# Patient Record
Sex: FEMALE | Race: WHITE | URBAN - METROPOLITAN AREA
[De-identification: names, ages, dates, MRNs, and addresses within clinical notes are randomized per-mention and may not be internally consistent; named-entity substitution may affect disease eponyms.]

---

## 2017-06-09 ENCOUNTER — HISTORICAL (OUTPATIENT)
Dept: ADMINISTRATIVE | Facility: HOSPITAL | Age: 67
End: 2017-06-09

## 2017-07-14 ENCOUNTER — HISTORICAL (OUTPATIENT)
Dept: ADMINISTRATIVE | Facility: HOSPITAL | Age: 67
End: 2017-07-14

## 2018-08-07 ENCOUNTER — HISTORICAL (OUTPATIENT)
Dept: ADMINISTRATIVE | Facility: HOSPITAL | Age: 68
End: 2018-08-07

## 2018-08-28 ENCOUNTER — HISTORICAL (OUTPATIENT)
Dept: ADMINISTRATIVE | Facility: HOSPITAL | Age: 68
End: 2018-08-28

## 2022-04-30 NOTE — OP NOTE
DATE OF SURGERY:   8-28-18    SURGEON:  Adrienne Knox MD    PREOPERATIVE DIAGNOSIS:  Nuclear sclerotic cataract, left  eye; primary open angle glaucoma left eye    POSTOPERATIVE DIAGNOSIS:  Same, status post procedure.    PROCEDURE:  Cataract extraction with intraocular lens implant of the left eye; Istent placement left eye    Anesthesia:  Local plus MAC.   Complications:  None.   Estimated blood loss:  0    PROCEDURE IN DETAIL:  After discussing risks, benefits and alternatives with the patient and family, informed consent was obtained from the patient in clinic.  The patient was brought to the operating room and placed in supine position.  MAC anesthesia was undertaken without complications.  The operative eye and periorbital region were prepped and draped in the usual manner for intraocular surgery.  A wire speculum was placed in the operative eye for adequate exposure.  A paracentesis was made at 4 o'clock through clear cornea at the limbus.  Viscoelastic was injected into the anterior chamber after lidocaine was injected.  A triplanar cataract incision was made at 2 o'clock through clear cornea at the limbus.  A curvilinear capsulorrhexis was created with a cystitome and finished with a Utrata forceps.  BSS in the cannula was used to hydrodissect the lens, and the lens was found to move freely within the capsular bag.  The lens was then removed in divide and conquer technique.  The remaining cortical material was removed with the irrigation aspiration handpiece.  A 21.0 diopter ZCBOO lens was then inserted into the capsular bag an remained in good position.  Miostat was injected into the AC followed by Healon GV. The I stent was inserted and sat in good position. There was blood reflux throgh the noozle. The remaining viscoelastic was then removed with the irrigation aspiration handpiece.  The wound was hydrated and found to be watertight. Postop injections were given. The wire speculum was then  removed, and the patient was cleaned in wet to dry fashion.  The patient tolerated the procedure well and left the operating room in stable condition.

## 2022-04-30 NOTE — OP NOTE
DATE OF SURGERY:   8-7-18    SURGEON:  Adrienne Knox MD    PREOPERATIVE DIAGNOSIS:  Nuclear sclerotic cataract, right  eye; primary open angle glaucoma right eye    POSTOPERATIVE DIAGNOSIS:  Same, status post procedure.    PROCEDURE:  Cataract extraction with intraocular lens implant of the right  eye; Istent placement right eye    Anesthesia:  Local plus MAC.   Complications:  None.   Estimated blood loss:  0    PROCEDURE IN DETAIL:  After discussing risks, benefits and alternatives with the patient and family, informed consent was obtained from the patient in clinic.  The patient was brought to the operating room and placed in supine position.  MAC anesthesia was undertaken without complications.  The operative eye and periorbital region were prepped and draped in the usual manner for intraocular surgery.  A wire speculum was placed in the operative eye for adequate exposure.  A paracentesis was made at 11 o'clock through clear cornea at the limbus.  Viscoelastic was injected into the anterior chamber.  A triplanar cataract incision was made at 9 o'clock through clear cornea at the limbus.  A curvilinear capsulorrhexis was created with a cystitome and finished with a Utrata forceps.  BSS in the cannula was used to hydrodissect the lens, and the lens was found to move freely within the capsular bag.  The lens was then removed in divide and conquer technique.  The remaining cortical material was removed with the irrigation aspiration handpiece.  A 20.0 diopter ZCBOO lens was then inserted into the capsular bag an remained in good position.  Miostat was injected into the AC followed by Healon GV. The I stent was inserted and sat in good position. There was blood reflux throgh the noozle. The remaining viscoelastic was then removed with the irrigation aspiration handpiece.  The wound was hydrated and found to be watertight. Postop injections were given. The wire speculum was then removed, and the patient was  cleaned in wet to dry fashion.  The patient tolerated the procedure well and left the operating room in stable condition.

## 2023-06-19 ENCOUNTER — TELEPHONE (OUTPATIENT)
Dept: GASTROENTEROLOGY | Facility: CLINIC | Age: 73
End: 2023-06-19
Payer: MEDICARE

## 2023-06-19 NOTE — TELEPHONE ENCOUNTER
----- Message from Kya Rivas sent at 6/19/2023  9:04 AM CDT -----  Contact: pt  Pt called to cancel for 6/20/2023 and does not want to reschedule pt is in hospital.  Pt can be reached at [phone]     Thanks,